# Patient Record
Sex: MALE | Race: WHITE | NOT HISPANIC OR LATINO | URBAN - NONMETROPOLITAN AREA
[De-identification: names, ages, dates, MRNs, and addresses within clinical notes are randomized per-mention and may not be internally consistent; named-entity substitution may affect disease eponyms.]

---

## 2023-08-31 ENCOUNTER — APPOINTMENT (OUTPATIENT)
Dept: URBAN - NONMETROPOLITAN AREA CLINIC 45 | Age: 38
Setting detail: DERMATOLOGY
End: 2023-08-31

## 2023-08-31 DIAGNOSIS — S31000A OPEN WOUND(S) (MULTIPLE) OF UNSPECIFIED SITE(S), WITHOUT MENTION OF COMPLICATION: ICD-10-CM

## 2023-08-31 DIAGNOSIS — L08.9 LOCAL INFECTION OF THE SKIN AND SUBCUTANEOUS TISSUE, UNSPECIFIED: ICD-10-CM

## 2023-08-31 PROBLEM — T07.XXXA UNSPECIFIED MULTIPLE INJURIES, INITIAL ENCOUNTER: Status: ACTIVE | Noted: 2023-08-31

## 2023-08-31 PROCEDURE — OTHER PRESCRIPTION MEDICATION MANAGEMENT: OTHER

## 2023-08-31 PROCEDURE — 99203 OFFICE O/P NEW LOW 30 MIN: CPT

## 2023-08-31 PROCEDURE — OTHER PRESCRIPTION: OTHER

## 2023-08-31 PROCEDURE — OTHER MIPS QUALITY: OTHER

## 2023-08-31 PROCEDURE — OTHER COUNSELING: OTHER

## 2023-08-31 RX ORDER — FLUCONAZOLE 150 MG/1
APPLY TABLET ORAL QD
Qty: 2 | Refills: 0 | Status: ERX | COMMUNITY
Start: 2023-08-31

## 2023-08-31 ASSESSMENT — LOCATION SIMPLE DESCRIPTION DERM: LOCATION SIMPLE: RIGHT PRETIBIAL REGION

## 2023-08-31 ASSESSMENT — LOCATION ZONE DERM: LOCATION ZONE: LEG

## 2023-08-31 ASSESSMENT — LOCATION DETAILED DESCRIPTION DERM: LOCATION DETAILED: RIGHT PROXIMAL PRETIBIAL REGION

## 2023-08-31 NOTE — PROCEDURE: COUNSELING
Detail Level: Detailed
Patient Specific Counseling (Will Not Stick From Patient To Patient): ***\\nMedications as above. Continue keeping area clean and wrapped with gauze and ace bandage.

## 2023-08-31 NOTE — PROCEDURE: PRESCRIPTION MEDICATION MANAGEMENT
Plan: Pt to take Bactrim DS given by pcp who did culture has Rx at pharmacy per pt \\nFluconazole 150 mg one tablet now and repeat 4 days later \\nBacterial, fungal and viral cultures sent to [facility] Plan: Pt to take Bactrim DS given by pcp who did culture has Rx at pharmacy per pt \\nFluconazole 150 mg one tablet now and repeat 4 days later \\nBacterial, fungal and viral cultures sent to [facility]
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
## 2023-09-14 ENCOUNTER — APPOINTMENT (OUTPATIENT)
Dept: URBAN - NONMETROPOLITAN AREA CLINIC 45 | Age: 38
Setting detail: DERMATOLOGY
End: 2023-09-18

## 2023-09-14 DIAGNOSIS — L08.9 LOCAL INFECTION OF THE SKIN AND SUBCUTANEOUS TISSUE, UNSPECIFIED: ICD-10-CM

## 2023-09-14 PROCEDURE — OTHER PRESCRIPTION: OTHER

## 2023-09-14 PROCEDURE — 99212 OFFICE O/P EST SF 10 MIN: CPT

## 2023-09-14 PROCEDURE — OTHER PRESCRIPTION MEDICATION MANAGEMENT: OTHER

## 2023-09-14 PROCEDURE — OTHER MEDICATION COUNSELING: OTHER

## 2023-09-14 PROCEDURE — OTHER MIPS QUALITY: OTHER

## 2023-09-14 RX ORDER — CLINDAMYCIN HYDROCHLORIDE 300 MG/1
CAPSULE ORAL BID
Qty: 14 | Refills: 0 | Status: ERX | COMMUNITY
Start: 2023-09-14

## 2023-09-14 ASSESSMENT — LOCATION DETAILED DESCRIPTION DERM: LOCATION DETAILED: RIGHT PROXIMAL PRETIBIAL REGION

## 2023-09-14 ASSESSMENT — LOCATION SIMPLE DESCRIPTION DERM: LOCATION SIMPLE: RIGHT PRETIBIAL REGION

## 2023-09-14 ASSESSMENT — LOCATION ZONE DERM: LOCATION ZONE: LEG

## 2023-09-14 NOTE — PROCEDURE: MEDICATION COUNSELING
8 Cantharidin Counseling:  I discussed with the patient the risks of Cantharidin including but not limited to pain, redness, burning, itching, and blistering.

## 2023-09-14 NOTE — PROCEDURE: MIPS QUALITY
Quality 110: Preventive Care And Screening: Influenza Immunization: Influenza Immunization Administered during Influenza season
Quality 226: Preventive Care And Screening: Tobacco Use: Screening And Cessation Intervention: Patient screened for tobacco use, is a smoker AND received Cessation Counseling within measurement period or in the six months prior to the measurement period
Detail Level: Detailed
Quality 130: Documentation Of Current Medications In The Medical Record: Current Medications Documented
Quality 431: Preventive Care And Screening: Unhealthy Alcohol Use - Screening: Patient not identified as an unhealthy alcohol user when screened for unhealthy alcohol use using a systematic screening method

## 2023-09-14 NOTE — PROCEDURE: MEDICATION COUNSELING
Xeltriciaz Pregnancy And Lactation Text: This medication is Pregnancy Category D and is not considered safe during pregnancy.  The risk during breast feeding is also uncertain.

## 2023-09-14 NOTE — PROCEDURE: MEDICATION COUNSELING
158.75 Spironolactone Pregnancy And Lactation Text: This medication can cause feminization of the male fetus and should be avoided during pregnancy. The active metabolite is also found in breast milk.

## 2023-09-14 NOTE — PROCEDURE: PRESCRIPTION MEDICATION MANAGEMENT
Modify Regimen: clindamycin HCl 300 mg capsule: Take one pill twice a day for 7 days.
Detail Level: Zone
Plan: Negative fungal and viral cultures.\\n\\nPt states he has an existing appt with VICTORINA BYRNE PA-C at [facility] on 10-9-23 for an excision of this area (pt came to us for a second opinion )\\nCan take an otc antihistamine once a day to help with itching \\nCont to clean and cover the area daily\\nWill send over notes to [facility]
Render In Strict Bullet Format?: No